# Patient Record
Sex: FEMALE | Race: WHITE | NOT HISPANIC OR LATINO | Employment: FULL TIME | URBAN - METROPOLITAN AREA
[De-identification: names, ages, dates, MRNs, and addresses within clinical notes are randomized per-mention and may not be internally consistent; named-entity substitution may affect disease eponyms.]

---

## 2024-01-31 ENCOUNTER — OFFICE VISIT (OUTPATIENT)
Dept: URGENT CARE | Facility: CLINIC | Age: 24
End: 2024-01-31
Payer: COMMERCIAL

## 2024-01-31 VITALS
WEIGHT: 133.25 LBS | BODY MASS INDEX: 22.2 KG/M2 | HEART RATE: 96 BPM | DIASTOLIC BLOOD PRESSURE: 63 MMHG | OXYGEN SATURATION: 98 % | RESPIRATION RATE: 18 BRPM | TEMPERATURE: 98 F | HEIGHT: 65 IN | SYSTOLIC BLOOD PRESSURE: 100 MMHG

## 2024-01-31 DIAGNOSIS — L28.2 PRURITIC RASH: ICD-10-CM

## 2024-01-31 DIAGNOSIS — B86 SCABIES: Primary | ICD-10-CM

## 2024-01-31 PROCEDURE — 99203 OFFICE O/P NEW LOW 30 MIN: CPT | Mod: S$GLB,,,

## 2024-01-31 RX ORDER — HYDROXYZINE PAMOATE 25 MG/1
25 CAPSULE ORAL EVERY 8 HOURS PRN
Qty: 15 CAPSULE | Refills: 0 | Status: SHIPPED | OUTPATIENT
Start: 2024-01-31 | End: 2024-02-05

## 2024-01-31 RX ORDER — PERMETHRIN 50 MG/G
CREAM TOPICAL ONCE
Qty: 60 G | Refills: 1 | Status: SHIPPED | OUTPATIENT
Start: 2024-01-31 | End: 2024-01-31

## 2024-01-31 RX ORDER — METHYLPREDNISOLONE 4 MG/1
TABLET ORAL
Qty: 21 EACH | Refills: 0 | Status: SHIPPED | OUTPATIENT
Start: 2024-01-31 | End: 2024-02-21

## 2024-01-31 NOTE — PATIENT INSTRUCTIONS
Apply cream from the neck down  Leave on for at least 8-12 hours before washing off  Reapply in 1-2 weeks if still symptomatic  Medrol dose pack for itching relief  Hydroxyzine at night for itching as this will cause drowsiness

## 2024-01-31 NOTE — LETTER
January 31, 2024      [facility] [address] [place] [address]  Phone: [phone]  Fax: [phone]       Patient: Rosalia Connell   YOB: 2000  Date of Visit: 01/31/2024    To Whom It May Concern:    Troy Connell  was at [facility] on 01/31/2024. The patient may return to work/school on 02/01/24 with no restrictions. If you have any questions or concerns, or if I can be of further assistance, please do not hesitate to contact me.    Sincerely,          Nadine Magana PA-C

## 2024-01-31 NOTE — PROGRESS NOTES
"Subjective:      Patient ID: Rosalia Connell is a 24 y.o. female.    Vitals:  height is 5' 5" (1.651 m) and weight is 60.5 kg (133 lb 4.3 oz). Her temperature is 97.8 °F (36.6 °C). Her blood pressure is 100/63 and her pulse is 96. Her respiration is 18 and oxygen saturation is 98%.     Chief Complaint: Rash    25 yo F with no significant pmhx here for evaluation of pruritic rash to extremities and torso for 2 weeks. Patient states hands/wrists seem to be the worst. Pruritus worse at night. She has tried cortisone cream and anti-histamines with no relief. Her boyfriend has had similar symptoms for the last few weeks as well. She denies any fevers, respiratory symptoms. Does not recall any insect bites/stings. Has had her apartment thoroughly cleaned and is in the process of having someone spray for bugs. Denies history of similar symptoms. No other acute complaints.     Rash  This is a new problem. The current episode started 1 to 4 weeks ago. The problem is unchanged. The rash is characterized by redness and itchiness. Associated symptoms include congestion. Pertinent negatives include no fever, shortness of breath or sore throat. Treatments tried: patient took some itch cream. otc. The treatment provided no relief.       Constitution: Negative for fever.   HENT:  Positive for congestion. Negative for sore throat.    Cardiovascular:  Negative for chest pain.   Respiratory:  Negative for shortness of breath.    Skin:  Positive for rash. Negative for wound, erythema, abscess and hives.   Allergic/Immunologic: Positive for itching. Negative for hives.      Objective:     Physical Exam   Constitutional: She is oriented to person, place, and time. She appears well-developed.   HENT:   Head: Normocephalic and atraumatic. Head is without abrasion, without contusion and without laceration.   Ears:   Right Ear: External ear normal.   Left Ear: External ear normal.   Nose: Nose normal.   Mouth/Throat: Oropharynx is clear and moist " and mucous membranes are normal.      Comments: No OP edema, tongue or lip swelling  Eyes: Conjunctivae, EOM and lids are normal. Pupils are equal, round, and reactive to light.   Neck: Trachea normal and phonation normal. Neck supple.   Cardiovascular: Normal rate, regular rhythm and normal heart sounds.   Pulmonary/Chest: Effort normal and breath sounds normal. No stridor. No respiratory distress.   Musculoskeletal: Normal range of motion.         General: Normal range of motion.   Neurological: She is alert and oriented to person, place, and time.   Skin: Skin is warm, dry, intact and no rash. Capillary refill takes less than 2 seconds. No abrasion, No burn, No bruising, No erythema and No ecchymosis         Comments: Erythematous papular rash with excoriations present over upper extremities, majority on distal portion with interdigital papules noted. No obvious burrowing present. No vesicles, bullae. No purulent drainage or other signs/symptoms to suggest secondary bacterial infection   Psychiatric: Her speech is normal and behavior is normal. Judgment and thought content normal.   Nursing note and vitals reviewed.      Assessment:     1. Scabies    2. Pruritic rash        Plan:       Scabies  -     permethrin (ELIMITE) 5 % cream; Apply topically once. Repeat application in one week for 1 dose  Dispense: 60 g; Refill: 1    Pruritic rash  -     methylPREDNISolone (MEDROL DOSEPACK) 4 mg tablet; use as directed  Dispense: 21 each; Refill: 0  -     hydrOXYzine pamoate (VISTARIL) 25 MG Cap; Take 1 capsule (25 mg total) by mouth every 8 (eight) hours as needed (itching).  Dispense: 15 capsule; Refill: 0          Medical Decision Making:   Initial Assessment:   VSS. Afebrile. No OP edema, tongue or lip swelling. Symptoms ongoing for 2 weeks, low suspicion for anaphylaxis. She is overall well-appearing. Itching occasionally but does not appear to be in any acute distress  Differential Diagnosis:   Scabies, bed bugs,  other insect bite/stings, HFM, secondary syphilis, contact dermatitis, etc.  Urgent Care Management:  Patient presents with signs and symptoms consistent with scabies. No signs on exam to suggest secondary bacterial infection. She has had no relief with OTC anti-itch medications. Discussed diagnosis and treatment plan including use of permethrin cream. Discussed adjunct measures for extreme pruritus including medrol dose pack and vistaril nightly. Also discussed important measures to eradicate scabies and this was included in discharge paperwork. She will need to follow-up with PCP. Patient's boyfriend will be in later this afternoon for evaluation ads well. Provided RTC and ER precautions. Patient verbalized understanding to the above plan of care and had no further question. Exited exam room in stable condition.

## 2024-12-15 ENCOUNTER — OFFICE VISIT (OUTPATIENT)
Dept: URGENT CARE | Facility: CLINIC | Age: 24
End: 2024-12-15
Payer: COMMERCIAL

## 2024-12-15 VITALS
HEIGHT: 65 IN | BODY MASS INDEX: 22.48 KG/M2 | SYSTOLIC BLOOD PRESSURE: 119 MMHG | TEMPERATURE: 98 F | WEIGHT: 134.94 LBS | OXYGEN SATURATION: 97 % | RESPIRATION RATE: 18 BRPM | DIASTOLIC BLOOD PRESSURE: 66 MMHG | HEART RATE: 88 BPM

## 2024-12-15 DIAGNOSIS — J10.1 INFLUENZA A: Primary | ICD-10-CM

## 2024-12-15 LAB
CTP QC/QA: YES
POC MOLECULAR INFLUENZA A AGN: POSITIVE
POC MOLECULAR INFLUENZA B AGN: NEGATIVE

## 2024-12-15 PROCEDURE — 87502 INFLUENZA DNA AMP PROBE: CPT | Mod: QW,S$GLB,, | Performed by: PHYSICIAN ASSISTANT

## 2024-12-15 PROCEDURE — 99214 OFFICE O/P EST MOD 30 MIN: CPT | Mod: S$GLB,,, | Performed by: PHYSICIAN ASSISTANT

## 2024-12-15 RX ORDER — BROMPHENIRAMINE MALEATE, PSEUDOEPHEDRINE HYDROCHLORIDE, AND DEXTROMETHORPHAN HYDROBROMIDE 2; 30; 10 MG/5ML; MG/5ML; MG/5ML
10 SYRUP ORAL EVERY 4 HOURS PRN
Qty: 120 ML | Refills: 0 | Status: SHIPPED | OUTPATIENT
Start: 2024-12-15 | End: 2024-12-25

## 2024-12-15 NOTE — PATIENT INSTRUCTIONS
You have been diagnosed with Influenza.   You are contagious for usually the first 4 days of symptoms and for 24 hours after your last fever.  Please drink plenty of fluids.  Please get plenty of rest.  Please return here or go to the Emergency Department for any concerns or worsening of condition.  Take tylenol (acetominophen) for fever, chills or body aches every 4 hours. do not exceed 4000 mg/ day.  OR you may take Motrin (Ibuprofen) 600mg every 6 hours for fever, chills, pain or inflammation.  Take bromfed for congestion, cough and runny nose. (do not combine with OTC medications also containing decongestant, antihistamine or cough syrup). Sterile saline rinses and flonase to decongest.

## 2024-12-15 NOTE — LETTER
December 15, 2024      [facility] [address] [place] [address]  Phone: [phone]  Fax: [phone]       Patient: Rosalia Connlel   YOB: 2000  Date of Visit: 12/15/2024    To Whom It May Concern:    Troy Connell  was at [facility] on 12/15/2024. She was diagnosed with Influenza A. The patient may return to work/school on 12/18.24 with restrictions to return if fever free for 24 hours and if symptoms are improving. If you have any questions or concerns, or if I can be of further assistance, please do not hesitate to contact me.    Sincerely,    Meredith Tamayo PA-C  [facility]

## 2024-12-15 NOTE — PROGRESS NOTES
"Subjective:      Patient ID: Rosalia Connell is a 24 y.o. female.    Vitals:  height is 5' 5" (1.651 m) and weight is 61.2 kg (134 lb 14.7 oz). Her tympanic temperature is 98.3 °F (36.8 °C). Her blood pressure is 119/66 and her pulse is 88. Her respiration is 18 and oxygen saturation is 97%.     Chief Complaint: Sinus Problem    Patient presents with sinus problem. symptons started 3 days ago. CCC, sneezing. No fever.       Sinus Problem  This is a new problem. Episode onset: 3 days ago. The problem is unchanged. There has been no fever. Her pain is at a severity of 3/10. The pain is mild. Associated symptoms include chills, congestion, coughing, headaches, a hoarse voice, sinus pressure, sneezing and a sore throat. Pertinent negatives include no diaphoresis, ear pain, neck pain, shortness of breath or swollen glands. Treatments tried: mucinex. The treatment provided mild relief.       Constitution: Positive for chills and fatigue. Negative for sweating and fever.   HENT:  Positive for congestion, postnasal drip, sinus pressure and sore throat. Negative for ear pain.    Neck: Negative for neck pain.   Respiratory:  Positive for cough and sputum production. Negative for shortness of breath.    Allergic/Immunologic: Positive for sneezing.   Neurological:  Positive for headaches.      Objective:     Physical Exam   Constitutional: She is oriented to person, place, and time. She appears well-developed. She is cooperative.  Non-toxic appearance. She does not appear ill. No distress.   HENT:   Head: Normocephalic and atraumatic.   Ears:   Right Ear: Hearing, tympanic membrane, external ear and ear canal normal.   Left Ear: Hearing, tympanic membrane, external ear and ear canal normal.   Nose: Rhinorrhea and congestion present. No mucosal edema or nasal deformity. No epistaxis. Right sinus exhibits no maxillary sinus tenderness and no frontal sinus tenderness. Left sinus exhibits no maxillary sinus tenderness and no frontal " sinus tenderness.   Mouth/Throat: Uvula is midline, oropharynx is clear and moist and mucous membranes are normal. No trismus in the jaw. Normal dentition. No uvula swelling. No oropharyngeal exudate, posterior oropharyngeal edema or posterior oropharyngeal erythema.   Eyes: Conjunctivae and lids are normal. No scleral icterus.   Neck: Trachea normal and phonation normal. Neck supple. No edema present. No erythema present. No neck rigidity present.   Cardiovascular: Normal rate, regular rhythm, normal heart sounds and normal pulses.   Pulmonary/Chest: Effort normal and breath sounds normal. No respiratory distress. She has no decreased breath sounds. She has no wheezes. She has no rhonchi. She has no rales.   Abdominal: Normal appearance.   Musculoskeletal: Normal range of motion.         General: No deformity. Normal range of motion.   Neurological: She is alert and oriented to person, place, and time. She exhibits normal muscle tone. Coordination normal.   Skin: Skin is warm, dry, intact, not diaphoretic and not pale.   Psychiatric: Her speech is normal and behavior is normal. Judgment and thought content normal.   Nursing note and vitals reviewed.    Results for orders placed or performed in visit on 12/15/24   POCT Influenza A/B MOLECULAR    Collection Time: 12/15/24  1:29 PM   Result Value Ref Range    POC Molecular Influenza A Ag Positive (A) Negative    POC Molecular Influenza B Ag Negative Negative     Acceptable Yes        Assessment:     1. Influenza A        Plan:       Influenza A  -     POCT Influenza A/B MOLECULAR  -     brompheniramine-pseudoeph-DM (BROMFED DM) 2-30-10 mg/5 mL Syrp; Take 10 mLs by mouth every 4 (four) hours as needed (cough,congestion,runny nose).  Dispense: 120 mL; Refill: 0    Meredith Tamayo PA-C  [facility]       Patient Instructions   You have been diagnosed with Influenza.   You are contagious for usually the first 4 days of symptoms and for 24  hours after your last fever.  Please drink plenty of fluids.  Please get plenty of rest.  Please return here or go to the Emergency Department for any concerns or worsening of condition.  Take tylenol (acetominophen) for fever, chills or body aches every 4 hours. do not exceed 4000 mg/ day.  OR you may take Motrin (Ibuprofen) 600mg every 6 hours for fever, chills, pain or inflammation.  Take bromfed for congestion, cough and runny nose. (do not combine with OTC medications also containing decongestant, antihistamine or cough syrup). Sterile saline rinses and flonase to decongest.           Medical Decision Making:   Clinical Tests:   Lab Tests: Ordered and Reviewed  Urgent Care Management:  Explained she is outside of the window for antiviral therapy to be effective. Recommend supportive care measures, including rest, increased fluids and cough and cold medications as needed.